# Patient Record
Sex: MALE | Race: WHITE | Employment: OTHER | URBAN - METROPOLITAN AREA
[De-identification: names, ages, dates, MRNs, and addresses within clinical notes are randomized per-mention and may not be internally consistent; named-entity substitution may affect disease eponyms.]

---

## 2022-12-19 ENCOUNTER — OFFICE VISIT (OUTPATIENT)
Dept: PRIMARY CARE CLINIC | Age: 77
End: 2022-12-19
Payer: COMMERCIAL

## 2022-12-19 VITALS
HEART RATE: 76 BPM | HEIGHT: 72 IN | BODY MASS INDEX: 27.09 KG/M2 | WEIGHT: 200 LBS | OXYGEN SATURATION: 97 % | DIASTOLIC BLOOD PRESSURE: 72 MMHG | SYSTOLIC BLOOD PRESSURE: 129 MMHG

## 2022-12-19 DIAGNOSIS — F41.9 ANXIETY: Primary | ICD-10-CM

## 2022-12-19 DIAGNOSIS — G47.00 INSOMNIA, UNSPECIFIED TYPE: ICD-10-CM

## 2022-12-19 DIAGNOSIS — R09.89 LABILE BLOOD PRESSURE: ICD-10-CM

## 2022-12-19 PROCEDURE — 1123F ACP DISCUSS/DSCN MKR DOCD: CPT | Performed by: FAMILY MEDICINE

## 2022-12-19 PROCEDURE — 99203 OFFICE O/P NEW LOW 30 MIN: CPT | Performed by: FAMILY MEDICINE

## 2022-12-19 RX ORDER — CITALOPRAM 20 MG/1
20 TABLET ORAL
COMMUNITY
Start: 2021-12-10

## 2022-12-19 RX ORDER — TRAZODONE HYDROCHLORIDE 100 MG/1
100 TABLET ORAL
COMMUNITY
Start: 2021-12-10

## 2022-12-19 RX ORDER — ATORVASTATIN CALCIUM 40 MG/1
TABLET, FILM COATED ORAL
COMMUNITY
Start: 2017-08-21

## 2022-12-19 RX ORDER — GLIMEPIRIDE 4 MG/1
4 TABLET ORAL
COMMUNITY
Start: 2021-12-10

## 2022-12-19 SDOH — ECONOMIC STABILITY: FOOD INSECURITY: WITHIN THE PAST 12 MONTHS, THE FOOD YOU BOUGHT JUST DIDN'T LAST AND YOU DIDN'T HAVE MONEY TO GET MORE.: NEVER TRUE

## 2022-12-19 SDOH — ECONOMIC STABILITY: FOOD INSECURITY: WITHIN THE PAST 12 MONTHS, YOU WORRIED THAT YOUR FOOD WOULD RUN OUT BEFORE YOU GOT MONEY TO BUY MORE.: NEVER TRUE

## 2022-12-19 ASSESSMENT — ENCOUNTER SYMPTOMS
HYPERVENTILATION: 0
NAUSEA: 0
SHORTNESS OF BREATH: 0

## 2022-12-19 ASSESSMENT — PATIENT HEALTH QUESTIONNAIRE - PHQ9
SUM OF ALL RESPONSES TO PHQ QUESTIONS 1-9: 0
1. LITTLE INTEREST OR PLEASURE IN DOING THINGS: 0
SUM OF ALL RESPONSES TO PHQ QUESTIONS 1-9: 0
SUM OF ALL RESPONSES TO PHQ9 QUESTIONS 1 & 2: 0
2. FEELING DOWN, DEPRESSED OR HOPELESS: 0

## 2022-12-19 ASSESSMENT — SOCIAL DETERMINANTS OF HEALTH (SDOH): HOW HARD IS IT FOR YOU TO PAY FOR THE VERY BASICS LIKE FOOD, HOUSING, MEDICAL CARE, AND HEATING?: NOT HARD AT ALL

## 2022-12-19 NOTE — PATIENT INSTRUCTIONS
Increase citalopram to 40mg daily. Take melatonin nightly as needed for insomnia  Schedule follow up with PCP to discuss anxiety/insomnia. May also consider buspar or ramelteon to help with insomnia if other therapies not working  If symptoms worsen or do not improve please follow-up with PCP or return to clinic  Discuss labile blood pressure with PCP and if medication should be started.

## 2022-12-19 NOTE — PROGRESS NOTES
[address] WALK IN [facility]  [address] [address]  Dept: [phone]  Dept Fax: [phone]    Frederick Choe is a 65 y.o. male who presents today for his medical conditions/complaintsas noted below. Frederick Choe is c/o of Hypertension (Shaky and nervious all over has not been able to sleep over the last couple days)        HPI:     Anxiety  Presents for follow-up (Symptoms had been well controlled until about 1 week ago) visit. Symptoms include depressed mood (mild), dizziness (only when laying in bed), excessive worry, insomnia, irritability and nervous/anxious behavior. Patient reports no hyperventilation, nausea, palpitations, panic, shortness of breath or suicidal ideas. Symptoms occur constantly. The severity of symptoms is moderate. The quality of sleep is poor. Nighttime awakenings: insomnia. Compliance with medications is %. Has been on trazodone and citalopram for years. NO complications  Past week has had increased anxiety and insomnia but cannot think of any triggers/stressors except for worrying about elevated blood pressure. He denies hx of hypertension but reports that he has fluctuating blood pressure readings at home and reports it got up to 211 last night. He brought is machine to office and it appears to be consistent with readings in office. Patients blood pressure was normal in office  Patient reports having blood work done last week but cannot remember what all he had done  History reviewed. No pertinent past medical history. History reviewed. No pertinent surgical history. Past medical history reviewed and pertinent positives/negatives in the HPI    History reviewed. No pertinent family history.     Social History     Tobacco Use    Smoking status: Not on file    Smokeless tobacco: Not on file   Substance Use Topics    Alcohol use: Not on file      Current Outpatient Medications   Medication Sig Dispense Refill    traZODone (DESYREL) 100 MG tablet Take 100 mg by mouth      metFORMIN (GLUCOPHAGE) 500 MG tablet Take 500 mg by mouth      atorvastatin (LIPITOR) 40 MG tablet TAKE ONE TABLET BY MOUTH ONCE NIGHTLY      citalopram (CELEXA) 20 MG tablet Take 20 mg by mouth      glimepiride (AMARYL) 4 MG tablet Take 4 mg by mouth      Melatonin 5 MG CAPS Take 1 capsule by mouth nightly 30 capsule 0     No current facility-administered medications for this visit. Allergies   Allergen Reactions    Hydromorphone Anaphylaxis and Other (See Comments)     Stopped breathing      Iodides Swelling     Contrast dye    Penicillins Rash       Health Maintenance   Topic Date Due    COVID-19 Vaccine (1) Never done    Lipids  Never done    Depression Screen  Never done    Hepatitis C screen  Never done    DTaP/Tdap/Td vaccine (1 - Tdap) Never done    Shingles vaccine (1 of 2) Never done    Pneumococcal 65+ years Vaccine (1 - PCV) Never done    Flu vaccine (1) Never done    Hepatitis A vaccine  Aged Out    Hib vaccine  Aged Out    Meningococcal (ACWY) vaccine  Aged Out       :      Review of Systems   Constitutional:  Positive for irritability. Respiratory:  Negative for shortness of breath. Cardiovascular:  Negative for palpitations. Gastrointestinal:  Negative for nausea. Neurological:  Positive for dizziness (only when laying in bed). Psychiatric/Behavioral:  Negative for suicidal ideas. The patient is nervous/anxious and has insomnia. Objective:     Physical Exam  Vitals and nursing note reviewed. Constitutional:       Appearance: Normal appearance. HENT:      Head: Normocephalic and atraumatic. Right Ear: Hearing normal.      Left Ear: Hearing normal.      Mouth/Throat:      Lips: Pink. Eyes:      Extraocular Movements: Extraocular movements intact. Conjunctiva/sclera: Conjunctivae normal.   Cardiovascular:      Rate and Rhythm: Normal rate and regular rhythm.       Heart sounds: Normal heart sounds. Pulmonary:      Effort: Pulmonary effort is normal.      Breath sounds: Normal breath sounds. Musculoskeletal:      Cervical back: No muscular tenderness. Skin:     General: Skin is warm and dry. Neurological:      Mental Status: He is alert and oriented to person, place, and time. Mental status is at baseline. Psychiatric:         Attention and Perception: Attention normal.         Mood and Affect: Mood normal.         Speech: Speech normal.         Behavior: Behavior normal.         Thought Content: Thought content normal.         Cognition and Memory: Memory normal.         Judgment: Judgment normal.     /72   Pulse 76   Ht 6' (1.829 m)   Wt 200 lb (90.7 kg)   SpO2 97%   BMI 27.12 kg/m²     Assessment:       Diagnosis Orders   1. Anxiety        2. Insomnia, unspecified type        3. Labile blood pressure            Plan:    Increase citalopram to 40mg daily. Take melatonin nightly as needed for insomnia  Schedule follow up with PCP to discuss anxiety/insomnia. May also consider buspar or ramelteon to help with insomnia if other therapies not working  If symptoms worsen or do not improve please follow-up with PCP or return to clinic  Discuss labile blood pressure with PCP and if medication should be started. No orders of the defined types were placed in this encounter. Orders Placed This Encounter   Medications    Melatonin 5 MG CAPS     Sig: Take 1 capsule by mouth nightly     Dispense:  30 capsule     Refill:  0      Patient given educational materials - see patient instructions. Discussed use, benefit, and side effects of prescribed medications. All patient questions answered. Pt voiced understanding. Patient agreed with treatment plan. Follow up as directed.      Electronicallysigned by Tom Ruth MD on 12/19/2022 at 11:45 AM

## 2023-01-27 RX ORDER — CHOLECALCIFEROL (VITAMIN D3) 125 MCG
CAPSULE ORAL
Qty: 30 TABLET | Refills: 0 | Status: SHIPPED | OUTPATIENT
Start: 2023-01-27

## 2024-08-01 ENCOUNTER — TELEPHONE (OUTPATIENT)
Age: 79
End: 2024-08-01

## 2024-08-01 NOTE — TELEPHONE ENCOUNTER
8/01/2024 - received voicemail from patients primary provider to schedule patient ASAP. Had a cancellation for 8/01/24. Called patient twice on 7/31/24 with appointment information which was left on his voicemail. Called patient in the morning of 8/01/24 with appointment information which was left on his voicemail. No return phone calls from patient to acknowledge our attempt to schedule him.